# Patient Record
Sex: MALE | Race: WHITE | HISPANIC OR LATINO | Employment: FULL TIME | URBAN - METROPOLITAN AREA
[De-identification: names, ages, dates, MRNs, and addresses within clinical notes are randomized per-mention and may not be internally consistent; named-entity substitution may affect disease eponyms.]

---

## 2024-09-30 ENCOUNTER — HOSPITAL ENCOUNTER (EMERGENCY)
Facility: CLINIC | Age: 19
Discharge: HOME | End: 2024-09-30
Attending: STUDENT IN AN ORGANIZED HEALTH CARE EDUCATION/TRAINING PROGRAM | Admitting: STUDENT IN AN ORGANIZED HEALTH CARE EDUCATION/TRAINING PROGRAM

## 2024-09-30 ENCOUNTER — APPOINTMENT (OUTPATIENT)
Dept: GENERAL RADIOLOGY | Facility: CLINIC | Age: 19
End: 2024-09-30
Attending: STUDENT IN AN ORGANIZED HEALTH CARE EDUCATION/TRAINING PROGRAM
Payer: OTHER MISCELLANEOUS

## 2024-09-30 ENCOUNTER — VIRTUAL VISIT (OUTPATIENT)
Dept: INTERPRETER SERVICES | Facility: CLINIC | Age: 19
End: 2024-09-30

## 2024-09-30 VITALS
TEMPERATURE: 98.5 F | SYSTOLIC BLOOD PRESSURE: 132 MMHG | RESPIRATION RATE: 20 BRPM | HEART RATE: 74 BPM | DIASTOLIC BLOOD PRESSURE: 80 MMHG | OXYGEN SATURATION: 98 % | WEIGHT: 103.62 LBS

## 2024-09-30 DIAGNOSIS — S42.291A CLOSED HILL-SACHS FRACTURE OF RIGHT HUMERUS, INITIAL ENCOUNTER: ICD-10-CM

## 2024-09-30 DIAGNOSIS — S43.004A SHOULDER DISLOCATION, RIGHT, INITIAL ENCOUNTER: ICD-10-CM

## 2024-09-30 DIAGNOSIS — S43.431A BANKART LESION OF RIGHT SHOULDER, INITIAL ENCOUNTER: ICD-10-CM

## 2024-09-30 PROCEDURE — 96374 THER/PROPH/DIAG INJ IV PUSH: CPT

## 2024-09-30 PROCEDURE — 73030 X-RAY EXAM OF SHOULDER: CPT | Mod: RT

## 2024-09-30 PROCEDURE — 250N000011 HC RX IP 250 OP 636: Performed by: STUDENT IN AN ORGANIZED HEALTH CARE EDUCATION/TRAINING PROGRAM

## 2024-09-30 PROCEDURE — 24500 CLTX HUMRL SHFT FX W/O MNPJ: CPT | Mod: RT

## 2024-09-30 PROCEDURE — 23655 CLTX SHO DSLC W/MNPJ W/ANES: CPT | Mod: RT

## 2024-09-30 PROCEDURE — 999N000063 XR SHOULDER RIGHT G/E 3 VIEWS: Mod: RT

## 2024-09-30 PROCEDURE — 99285 EMERGENCY DEPT VISIT HI MDM: CPT | Mod: 25

## 2024-09-30 RX ORDER — FENTANYL CITRATE 50 UG/ML
100 INJECTION, SOLUTION INTRAMUSCULAR; INTRAVENOUS ONCE
Status: COMPLETED | OUTPATIENT
Start: 2024-09-30 | End: 2024-09-30

## 2024-09-30 RX ADMIN — FENTANYL CITRATE 100 MCG: 50 INJECTION, SOLUTION INTRAMUSCULAR; INTRAVENOUS at 16:36

## 2024-09-30 ASSESSMENT — COLUMBIA-SUICIDE SEVERITY RATING SCALE - C-SSRS
1. IN THE PAST MONTH, HAVE YOU WISHED YOU WERE DEAD OR WISHED YOU COULD GO TO SLEEP AND NOT WAKE UP?: NO
6. HAVE YOU EVER DONE ANYTHING, STARTED TO DO ANYTHING, OR PREPARED TO DO ANYTHING TO END YOUR LIFE?: NO
2. HAVE YOU ACTUALLY HAD ANY THOUGHTS OF KILLING YOURSELF IN THE PAST MONTH?: NO

## 2024-09-30 ASSESSMENT — ACTIVITIES OF DAILY LIVING (ADL)
ADLS_ACUITY_SCORE: 35

## 2024-09-30 NOTE — ED PROVIDER NOTES
Emergency Department Note      History of Present Illness     Chief Complaint   Dislocation      HPI   Kaleb Bautista is a 18 year old male otherwise healthy, presents with concerns of right shoulder dislocation.  Patient was at work when he slipped and fell onto his right arm.  He has localized pain to the right shoulder.  Did not hit head.  No numbness.  No other injuries.  Never injured his shoulder before.    Independent Historian    utilized for discussions.    Review of External Notes   none    Past Medical History     Medical History and Problem List   No past medical history on file.    Medications   No current outpatient medications on file.      Surgical History   No past surgical history on file.    Physical Exam     Patient Vitals for the past 24 hrs:   BP Temp Pulse Resp SpO2 Weight   09/30/24 1646 138/83 -- 74 18 -- --   09/30/24 1638 134/89 -- 63 20 100 % --   09/30/24 1530 (!) 128/91 -- 65 20 99 % --   09/30/24 1503 129/80 -- 72 20 -- 47 kg (103 lb 9.9 oz)   09/30/24 1436 135/88 98.5  F (36.9  C) 85 18 98 % --     Physical Exam  GENERAL: Patient well-appearing  HEAD: Atraumatic.  NECK: No rigidity  CV: RRR, no murmurs, rubs or gallops  PULM: CTAB with good aeration; no retractions, rales, rhonchi, or wheezing  DERM: No rash. Skin warm and dry  EXTREMITY: Obvious step-off deformity at right shoulder.  No open fracture.  No skin tenting.  Shoulder compartments soft.  VASCULAR: Right radial pulse 2+.  Neuro: Right hand  strength 5 out of 5.    Diagnostics     Lab Results   Labs Ordered and Resulted from Time of ED Arrival to Time of ED Departure - No data to display    Imaging   XR Shoulder Right G/E 3 Views   Final Result   IMPRESSION: Interval reduction of right shoulder dislocation now with normal glenohumeral alignment.      XR Shoulder Right G/E 3 Views   Final Result   IMPRESSION: Anterior dislocation of the humeral head in relation to   the glenoid. Questionable small bony  Bankart and Hill-Sachs fractures.   Otherwise negative.      ELSA HANKS MD            SYSTEM ID:  [id number]           Independent Interpretation   X-ray right shoulder showing dislocation and repeat x-ray showing reduction.    ED Course      Medications Administered   Medications   fentaNYL (PF) (SUBLIMAZE) injection 100 mcg (100 mcg Intravenous $Given 9/30/24 5023)       Procedures     Dislocation Reduction   Procedure: Dislocation Reduction  Consent: Verbal from Patient  Risks Discussed: Pain, need for repeat attempts, fracture, neurovascular injury, unsuccessful attempts, and need to go to OR  Universal Protocol: Universal protocol was followed and time out conducted just prior to starting procedure, confirming patient identity, site/side, procedure, patient position, and availability of correct equipment and implants.    Indication: Dislocated Shoulder   Location: Right Shoulder  Anesthesia/Sedation: 100mcg fentanyl IV  Procedure Detail: I manipulated the joint including External rotation    Immobilized with Sling/shoulder immobilizer   Post procedure assessment:  Gross deformity resolved , Neurovascular intact , and ROM improved   Patient Status: The patient tolerated the procedure well: Yes. There were no complications.      Discussion of Management   None    ED Course        Additional Documentation  None    Medical Decision Making / Diagnosis     CMS Diagnoses: None    MIPS       None    [facility]   Kaleb Bautista is a 18 year old male     Symptoms most consistent with shoulder dislocation.    DDX considered fracture, compartment syndrome, neurovascular injury.    Consented patient and shoulder reduced.  Afterwards he ranged the shoulder around by himself very well without pain.    Post-reduction imaging obtained. X-ray interpreted by me showing appropriate reduction.    Placed in sling. Recommended gentle ROM as tolerated.     Given information for [facility] orthopedic.    Patient made aware that the  shoulder joint may have additional injuries including Hill-Sachs deformity and Bankart lesion potentially seen on initial x-ray and that this may require additional imaging. Discussed that the shoulder could dislocate again.     Patient was evaluated for acute medical emergencies. Based on my clinical assessment, I do not think any further acute management or work-up is required.  Patient stable for discharge. Given strict return precautions. All questions answered. Patient content with plan. Recommended orthopedic follow-up within 1-2 weeks.      Disposition   The patient was discharged.     Diagnosis     ICD-10-CM    1. Shoulder dislocation, right, initial encounter  S43.004A       2. Bankart lesion of right shoulder, initial encounter  S43.491A       3. Closed Hill-Sachs fracture of right humerus, initial encounter  S42.291A            Discharge Medications   New Prescriptions    No medications on file         MD Destiny Lux Kevin, MD  09/30/24 4113

## 2024-09-30 NOTE — DISCHARGE INSTRUCTIONS
Return to the emergency department if symptoms are worsening, become concerning, or for any other concerns.  Keep your shoulder in a sling for 10 days.  Contact [facility] orthopedics for follow-up.  You may need additional imaging.  There may be other internal injuries to the shoulder.

## 2024-09-30 NOTE — ED TRIAGE NOTES
Slip and fall and work. Possible right shoulder dislocation.      Triage Assessment (Adult)       Row Name 09/30/24 7582          Triage Assessment    Airway WDL WDL        Respiratory WDL    Respiratory WDL WDL        Skin Circulation/Temperature WDL    Skin Circulation/Temperature WDL WDL        Cardiac WDL    Cardiac WDL WDL

## 2024-09-30 NOTE — ED NOTES
Patient was changed into a gown, half way and out of his long sleeve shirt. Patient was given the call light and instructed on questions and that he will be seen shortly.

## 2024-09-30 NOTE — ED NOTES
Language services  ID # [id number] utilized. Pt reports he was on a truck  that was at a complete stopped when he fell on the truck and landed on his Lt side while moving funitures. Pt denied hitting his head, denied LOC, neck pain or dizziness. He c/o rt shopulder pain 6/10 with intermittented tingling sensation.  Rt shoulder  swolled, + radial pulse, CMS intact. Pt reports he has no medical history, takes no medication and does not have an allergy to medications or food.